# Patient Record
Sex: FEMALE | Race: WHITE | NOT HISPANIC OR LATINO | URBAN - METROPOLITAN AREA
[De-identification: names, ages, dates, MRNs, and addresses within clinical notes are randomized per-mention and may not be internally consistent; named-entity substitution may affect disease eponyms.]

---

## 2023-10-11 PROBLEM — F41.8 ANXIETY WITH DEPRESSION: Status: ACTIVE | Noted: 2023-10-11

## 2023-10-11 PROBLEM — E03.9 HYPOTHYROIDISM: Status: ACTIVE | Noted: 2023-10-11

## 2023-10-11 PROBLEM — E28.2 PCOS (POLYCYSTIC OVARIAN SYNDROME): Status: ACTIVE | Noted: 2023-10-11

## 2023-10-11 RX ORDER — ONDANSETRON HYDROCHLORIDE 8 MG/1
TABLET, FILM COATED ORAL
COMMUNITY
Start: 2016-06-07 | End: 2023-10-12

## 2023-10-11 RX ORDER — LEVOTHYROXINE SODIUM 100 UG/1
TABLET ORAL
COMMUNITY
End: 2023-10-16 | Stop reason: SDUPTHER

## 2023-10-11 RX ORDER — SERTRALINE HYDROCHLORIDE 50 MG/1
TABLET, FILM COATED ORAL
COMMUNITY
End: 2023-10-12 | Stop reason: ALTCHOICE

## 2023-10-11 RX ORDER — SPIRONOLACTONE 50 MG/1
TABLET, FILM COATED ORAL
COMMUNITY
Start: 2020-10-23 | End: 2023-10-12

## 2023-10-12 ENCOUNTER — OFFICE VISIT (OUTPATIENT)
Dept: PRIMARY CARE | Facility: CLINIC | Age: 43
End: 2023-10-12
Payer: COMMERCIAL

## 2023-10-12 VITALS
HEART RATE: 77 BPM | SYSTOLIC BLOOD PRESSURE: 124 MMHG | DIASTOLIC BLOOD PRESSURE: 72 MMHG | WEIGHT: 193 LBS | HEIGHT: 65 IN | OXYGEN SATURATION: 98 % | BODY MASS INDEX: 32.15 KG/M2

## 2023-10-12 DIAGNOSIS — L65.0 TELOGEN EFFLUVIUM: ICD-10-CM

## 2023-10-12 DIAGNOSIS — E03.8 HYPOTHYROIDISM DUE TO HASHIMOTO'S THYROIDITIS: Primary | ICD-10-CM

## 2023-10-12 DIAGNOSIS — E06.3 HYPOTHYROIDISM DUE TO HASHIMOTO'S THYROIDITIS: Primary | ICD-10-CM

## 2023-10-12 PROCEDURE — 1036F TOBACCO NON-USER: CPT

## 2023-10-12 PROCEDURE — 99213 OFFICE O/P EST LOW 20 MIN: CPT

## 2023-10-12 ASSESSMENT — ENCOUNTER SYMPTOMS
DIARRHEA: 0
CONSTIPATION: 0
PALPITATIONS: 0
LOSS OF SENSATION IN FEET: 0
FATIGUE: 1
UNEXPECTED WEIGHT CHANGE: 0
FEVER: 0
OCCASIONAL FEELINGS OF UNSTEADINESS: 0
DEPRESSION: 0

## 2023-10-12 ASSESSMENT — PAIN SCALES - GENERAL: PAINLEVEL: 5

## 2023-10-12 ASSESSMENT — PATIENT HEALTH QUESTIONNAIRE - PHQ9
SUM OF ALL RESPONSES TO PHQ9 QUESTIONS 1 AND 2: 0
2. FEELING DOWN, DEPRESSED OR HOPELESS: NOT AT ALL
1. LITTLE INTEREST OR PLEASURE IN DOING THINGS: NOT AT ALL

## 2023-10-12 NOTE — PROGRESS NOTES
"Subjective   Patient ID: Hannah BELLE is a 43 y.o. female who presents for Alopecia.    HPI   PMHx: Hypothyroidism (on 100 mcg, last TSH stable on 6/14/23)    All of sudden hair loss has increased for last 1-1.5 months. Patient states coming out by the handful. Patient increased life stress in April with divorce and 2 months ago youngest son has been having mental health problems. Associated dry skin, mild fatigue, cold intolerance. Denies constipation, diarrhea, heart palpitations, hand tremor, fever, no recent illnesses.     Review of Systems   Constitutional:  Positive for fatigue. Negative for fever and unexpected weight change.   Cardiovascular:  Negative for palpitations.   Gastrointestinal:  Negative for constipation and diarrhea.   Endocrine: Positive for cold intolerance. Negative for heat intolerance.       Objective   /72   Pulse 77   Ht 1.651 m (5' 5\")   Wt 87.5 kg (193 lb)   SpO2 98%   BMI 32.12 kg/m²     Physical Exam  Constitutional:       General: She is not in acute distress.     Appearance: Normal appearance.   HENT:      Head: Hair is abnormal.      Comments: Thinning of hair especially around crown of head with + pull test  Cardiovascular:      Rate and Rhythm: Normal rate and regular rhythm.      Heart sounds: No murmur heard.  Pulmonary:      Effort: Pulmonary effort is normal.      Breath sounds: Normal breath sounds. No wheezing, rhonchi or rales.   Skin:     General: Skin is warm and dry.      Findings: No rash.   Neurological:      Mental Status: She is alert.   Psychiatric:         Mood and Affect: Mood and affect normal.         Assessment/Plan   Diagnoses and all orders for this visit:  Hypothyroidism due to Hashimoto's thyroiditis  -     Tsh With Reflex To Free T4 If Abnormal; Future  Telogen effluvium  -Given patient printout information on Telogen effluvium, and recommended stress reduction, OTC Hair speed oil or rosemary oil, scalp massage and 5% Minoxidil.        "

## 2023-10-14 ENCOUNTER — LAB (OUTPATIENT)
Dept: LAB | Facility: LAB | Age: 43
End: 2023-10-14
Payer: COMMERCIAL

## 2023-10-14 DIAGNOSIS — E03.8 HYPOTHYROIDISM DUE TO HASHIMOTO'S THYROIDITIS: ICD-10-CM

## 2023-10-14 DIAGNOSIS — E06.3 HYPOTHYROIDISM DUE TO HASHIMOTO'S THYROIDITIS: ICD-10-CM

## 2023-10-14 PROCEDURE — 36415 COLL VENOUS BLD VENIPUNCTURE: CPT

## 2023-10-14 PROCEDURE — 84443 ASSAY THYROID STIM HORMONE: CPT

## 2023-10-14 PROCEDURE — 84439 ASSAY OF FREE THYROXINE: CPT

## 2023-10-15 LAB
T4 FREE SERPL-MCNC: 1.5 NG/DL (ref 0.9–1.7)
TSH SERPL DL<=0.05 MIU/L-ACNC: 4.45 MIU/L (ref 0.27–4.2)

## 2023-10-16 ENCOUNTER — TELEPHONE (OUTPATIENT)
Dept: PRIMARY CARE | Facility: CLINIC | Age: 43
End: 2023-10-16
Payer: COMMERCIAL

## 2023-10-16 DIAGNOSIS — E03.8 HYPOTHYROIDISM DUE TO HASHIMOTO'S THYROIDITIS: Primary | ICD-10-CM

## 2023-10-16 DIAGNOSIS — E06.3 HYPOTHYROIDISM DUE TO HASHIMOTO'S THYROIDITIS: Primary | ICD-10-CM

## 2023-10-16 RX ORDER — LEVOTHYROXINE SODIUM 112 UG/1
112 TABLET ORAL DAILY
Qty: 30 TABLET | Refills: 1 | Status: SHIPPED | OUTPATIENT
Start: 2023-10-16 | End: 2023-11-17 | Stop reason: SDUPTHER

## 2023-10-16 NOTE — TELEPHONE ENCOUNTER
Thyroid shows just slightly under-treated. Will increase Levothyroxine to 112 mcg, and re-check in 6 weeks. New med sent, labs ordered for 11/30/23.

## 2023-11-11 ENCOUNTER — LAB (OUTPATIENT)
Dept: LAB | Facility: LAB | Age: 43
End: 2023-11-11
Payer: COMMERCIAL

## 2023-11-11 DIAGNOSIS — E03.8 HYPOTHYROIDISM DUE TO HASHIMOTO'S THYROIDITIS: ICD-10-CM

## 2023-11-11 DIAGNOSIS — E06.3 HYPOTHYROIDISM DUE TO HASHIMOTO'S THYROIDITIS: ICD-10-CM

## 2023-11-11 LAB — TSH SERPL DL<=0.05 MIU/L-ACNC: 2.51 MIU/L (ref 0.27–4.2)

## 2023-11-11 PROCEDURE — 36415 COLL VENOUS BLD VENIPUNCTURE: CPT

## 2023-11-11 PROCEDURE — 84443 ASSAY THYROID STIM HORMONE: CPT

## 2023-11-13 DIAGNOSIS — E03.8 HYPOTHYROIDISM DUE TO HASHIMOTO'S THYROIDITIS: ICD-10-CM

## 2023-11-13 DIAGNOSIS — E06.3 HYPOTHYROIDISM DUE TO HASHIMOTO'S THYROIDITIS: ICD-10-CM

## 2023-11-13 NOTE — TELEPHONE ENCOUNTER
Thyroid is stable again. If levothyroxine medication refills needed let me know. (Make sure dose is correct in system).

## 2023-11-17 RX ORDER — LEVOTHYROXINE SODIUM 112 UG/1
112 TABLET ORAL DAILY
Qty: 90 TABLET | Refills: 3 | Status: SHIPPED | OUTPATIENT
Start: 2023-11-17 | End: 2024-01-08 | Stop reason: WASHOUT

## 2023-11-17 NOTE — TELEPHONE ENCOUNTER
Pt called and verbally understands. Pt state that she does need refills. Pt is taking Levothyroxine 112mcg.

## 2024-01-08 DIAGNOSIS — E03.8 HYPOTHYROIDISM DUE TO HASHIMOTO'S THYROIDITIS: Primary | ICD-10-CM

## 2024-01-08 DIAGNOSIS — E06.3 HYPOTHYROIDISM DUE TO HASHIMOTO'S THYROIDITIS: Primary | ICD-10-CM

## 2024-01-08 RX ORDER — LEVOTHYROXINE SODIUM 100 UG/1
100 TABLET ORAL
Qty: 90 TABLET | Refills: 2 | Status: SHIPPED | OUTPATIENT
Start: 2024-01-08 | End: 2024-02-02 | Stop reason: SDUPTHER

## 2024-02-02 ENCOUNTER — TELEPHONE (OUTPATIENT)
Dept: PRIMARY CARE | Facility: CLINIC | Age: 44
End: 2024-02-02
Payer: COMMERCIAL

## 2024-02-02 DIAGNOSIS — E06.3 HYPOTHYROIDISM DUE TO HASHIMOTO'S THYROIDITIS: ICD-10-CM

## 2024-02-02 DIAGNOSIS — E03.8 HYPOTHYROIDISM DUE TO HASHIMOTO'S THYROIDITIS: ICD-10-CM

## 2024-02-02 RX ORDER — LEVOTHYROXINE SODIUM 112 UG/1
112 TABLET ORAL
Qty: 90 TABLET | Refills: 0 | Status: SHIPPED | OUTPATIENT
Start: 2024-02-02 | End: 2024-04-24 | Stop reason: SDUPTHER

## 2024-02-02 RX ORDER — LEVOTHYROXINE SODIUM 112 UG/1
112 TABLET ORAL
Qty: 90 TABLET | Refills: 0 | Status: SHIPPED | OUTPATIENT
Start: 2024-02-02 | End: 2024-02-02 | Stop reason: WASHOUT

## 2024-02-02 NOTE — TELEPHONE ENCOUNTER
Last TSH on 112 mcg was normal, so I resent 90 refill for medication. At this time does not need to repeat labs but I will place order if patient wants them re-checked or was told by another provider needs re-checked.

## 2024-02-02 NOTE — TELEPHONE ENCOUNTER
Pt called requesting blood work orders be put in -- pt states she is required to get it done in order to get her Levothyroxine refills.      LOV:10/12/23

## 2024-04-24 ENCOUNTER — OFFICE VISIT (OUTPATIENT)
Dept: PRIMARY CARE | Facility: CLINIC | Age: 44
End: 2024-04-24
Payer: COMMERCIAL

## 2024-04-24 VITALS
BODY MASS INDEX: 31.49 KG/M2 | HEIGHT: 65 IN | HEART RATE: 80 BPM | SYSTOLIC BLOOD PRESSURE: 130 MMHG | DIASTOLIC BLOOD PRESSURE: 80 MMHG | OXYGEN SATURATION: 98 % | WEIGHT: 189 LBS

## 2024-04-24 DIAGNOSIS — E06.3 HYPOTHYROIDISM DUE TO HASHIMOTO'S THYROIDITIS: ICD-10-CM

## 2024-04-24 DIAGNOSIS — H00.011 HORDEOLUM EXTERNUM OF RIGHT UPPER EYELID: Primary | ICD-10-CM

## 2024-04-24 DIAGNOSIS — E03.8 HYPOTHYROIDISM DUE TO HASHIMOTO'S THYROIDITIS: ICD-10-CM

## 2024-04-24 PROCEDURE — 99213 OFFICE O/P EST LOW 20 MIN: CPT

## 2024-04-24 PROCEDURE — 1036F TOBACCO NON-USER: CPT

## 2024-04-24 RX ORDER — ERYTHROMYCIN 5 MG/G
OINTMENT OPHTHALMIC 4 TIMES DAILY
Qty: 3.5 G | Refills: 0 | Status: SHIPPED | OUTPATIENT
Start: 2024-04-24 | End: 2024-05-01

## 2024-04-24 RX ORDER — LEVOTHYROXINE SODIUM 112 UG/1
112 TABLET ORAL
Qty: 90 TABLET | Refills: 1 | Status: SHIPPED | OUTPATIENT
Start: 2024-04-24

## 2024-04-24 ASSESSMENT — ENCOUNTER SYMPTOMS
FEVER: 0
EYE ITCHING: 1
EYE REDNESS: 1
EYE DISCHARGE: 0
FATIGUE: 0
EYE PAIN: 1
CHILLS: 0

## 2024-04-24 ASSESSMENT — PAIN SCALES - GENERAL: PAINLEVEL: 0-NO PAIN

## 2024-04-24 ASSESSMENT — PATIENT HEALTH QUESTIONNAIRE - PHQ9
SUM OF ALL RESPONSES TO PHQ9 QUESTIONS 1 AND 2: 0
1. LITTLE INTEREST OR PLEASURE IN DOING THINGS: NOT AT ALL
2. FEELING DOWN, DEPRESSED OR HOPELESS: NOT AT ALL

## 2024-04-24 NOTE — PROGRESS NOTES
"Subjective   Patient ID: Hannah BELLE is a 43 y.o. female who presents for Med Refill (Levothyroxine ) and Eye Problem (Pt rt eye red and swollen, pain since Sunday /la).    Hx of hashimoto's hypothyroidism    Hypothyroidism: currently controlled on 112 mcg, adjustments made last fall. Last TSH stable 11/2023. Feeling euthyroid. Patient feels much better on higher dose, hair loss and fatigue improved.     Upper eyelid swelling x 3 days. Patient states had stye on left eye as well, but currently worsening on right side. She is doing warm compresses.          Review of Systems   Constitutional:  Negative for chills, fatigue and fever.   Eyes:  Positive for pain, redness and itching. Negative for discharge and visual disturbance.   Endocrine: Negative for cold intolerance and heat intolerance.       Objective   /80   Pulse 80   Ht 1.651 m (5' 5\")   Wt 85.7 kg (189 lb)   SpO2 98%   BMI 31.45 kg/m²     Physical Exam  Constitutional:       General: She is not in acute distress.     Appearance: Normal appearance.   Eyes:      General:         Right eye: Hordeolum present.      Comments: Upper eyelid red swollen with stye in the middle of lash line   Neck:      Thyroid: No thyroid mass or thyromegaly.   Cardiovascular:      Rate and Rhythm: Normal rate and regular rhythm.      Heart sounds: No murmur heard.  Pulmonary:      Effort: Pulmonary effort is normal.      Breath sounds: Normal breath sounds. No wheezing, rhonchi or rales.   Skin:     General: Skin is warm and dry.      Findings: No rash.   Neurological:      Mental Status: She is alert.   Psychiatric:         Mood and Affect: Mood and affect normal.         Assessment/Plan   Diagnoses and all orders for this visit:  Hordeolum externum of right upper eyelid  -     erythromycin (Romycin) 5 mg/gram (0.5 %) ophthalmic ointment; Apply to affected eye(s) 4 times a day for 7 days. Apply Amount per Dose: 0.25 inch (~0.5 cm) per dose.  Hypothyroidism due to " Hashimoto's thyroiditis  -     levothyroxine (Synthroid, Levoxyl) 112 mcg tablet; Take 1 tablet (112 mcg) by mouth once daily in the morning. Take before meals. on an empty stomach  -     Tsh With Reflex To Free T4 If Abnormal; Future  Continue warm compresses. Follow-up in 6 months for CPE.

## 2024-04-27 ENCOUNTER — LAB (OUTPATIENT)
Dept: LAB | Facility: LAB | Age: 44
End: 2024-04-27
Payer: COMMERCIAL

## 2024-04-27 DIAGNOSIS — E03.8 HYPOTHYROIDISM DUE TO HASHIMOTO'S THYROIDITIS: ICD-10-CM

## 2024-04-27 DIAGNOSIS — E06.3 HYPOTHYROIDISM DUE TO HASHIMOTO'S THYROIDITIS: ICD-10-CM

## 2024-04-27 LAB — TSH SERPL DL<=0.05 MIU/L-ACNC: 2.69 MIU/L (ref 0.27–4.2)

## 2024-04-27 PROCEDURE — 36415 COLL VENOUS BLD VENIPUNCTURE: CPT

## 2024-04-27 PROCEDURE — 84443 ASSAY THYROID STIM HORMONE: CPT

## 2024-04-29 ENCOUNTER — TELEPHONE (OUTPATIENT)
Dept: PRIMARY CARE | Facility: CLINIC | Age: 44
End: 2024-04-29
Payer: COMMERCIAL

## 2024-10-24 ENCOUNTER — OFFICE VISIT (OUTPATIENT)
Dept: PRIMARY CARE | Facility: CLINIC | Age: 44
End: 2024-10-24
Payer: COMMERCIAL

## 2024-10-24 VITALS
OXYGEN SATURATION: 98 % | HEIGHT: 65 IN | DIASTOLIC BLOOD PRESSURE: 72 MMHG | SYSTOLIC BLOOD PRESSURE: 116 MMHG | WEIGHT: 196 LBS | HEART RATE: 79 BPM | BODY MASS INDEX: 32.65 KG/M2

## 2024-10-24 DIAGNOSIS — E06.3 HYPOTHYROIDISM DUE TO HASHIMOTO THYROIDITIS: ICD-10-CM

## 2024-10-24 DIAGNOSIS — Z90.710 S/P HYSTERECTOMY: ICD-10-CM

## 2024-10-24 DIAGNOSIS — Z12.31 SCREENING MAMMOGRAM FOR BREAST CANCER: ICD-10-CM

## 2024-10-24 DIAGNOSIS — Z00.00 ANNUAL PHYSICAL EXAM: Primary | ICD-10-CM

## 2024-10-24 DIAGNOSIS — Z13.1 SCREENING FOR DIABETES MELLITUS: ICD-10-CM

## 2024-10-24 DIAGNOSIS — Z11.59 ENCOUNTER FOR HEPATITIS C SCREENING TEST FOR LOW RISK PATIENT: ICD-10-CM

## 2024-10-24 DIAGNOSIS — Z13.220 SCREENING FOR LIPID DISORDERS: ICD-10-CM

## 2024-10-24 DIAGNOSIS — Z23 ENCOUNTER FOR IMMUNIZATION: ICD-10-CM

## 2024-10-24 DIAGNOSIS — E55.9 VITAMIN D DEFICIENCY: ICD-10-CM

## 2024-10-24 DIAGNOSIS — R60.0 PERIPHERAL EDEMA: ICD-10-CM

## 2024-10-24 PROCEDURE — 90471 IMMUNIZATION ADMIN: CPT

## 2024-10-24 PROCEDURE — 3008F BODY MASS INDEX DOCD: CPT

## 2024-10-24 PROCEDURE — 90656 IIV3 VACC NO PRSV 0.5 ML IM: CPT

## 2024-10-24 PROCEDURE — 99396 PREV VISIT EST AGE 40-64: CPT

## 2024-10-24 PROCEDURE — 90472 IMMUNIZATION ADMIN EACH ADD: CPT

## 2024-10-24 PROCEDURE — 1036F TOBACCO NON-USER: CPT

## 2024-10-24 PROCEDURE — 90715 TDAP VACCINE 7 YRS/> IM: CPT

## 2024-10-24 ASSESSMENT — ENCOUNTER SYMPTOMS
ADENOPATHY: 0
HEMATURIA: 0
ARTHRALGIAS: 0
FATIGUE: 0
DIFFICULTY URINATING: 0
SHORTNESS OF BREATH: 0
FEVER: 0
BLOOD IN STOOL: 0
SORE THROAT: 0
LIGHT-HEADEDNESS: 0
COUGH: 0
NAUSEA: 0
DIARRHEA: 0
JOINT SWELLING: 1
DIAPHORESIS: 0
PALPITATIONS: 0
MYALGIAS: 0
WHEEZING: 0
DYSURIA: 0
VOMITING: 0

## 2024-10-24 ASSESSMENT — PATIENT HEALTH QUESTIONNAIRE - PHQ9
1. LITTLE INTEREST OR PLEASURE IN DOING THINGS: NOT AT ALL
2. FEELING DOWN, DEPRESSED OR HOPELESS: NOT AT ALL
SUM OF ALL RESPONSES TO PHQ9 QUESTIONS 1 AND 2: 0

## 2024-10-24 ASSESSMENT — PAIN SCALES - GENERAL: PAINLEVEL_OUTOF10: 0-NO PAIN

## 2024-10-24 NOTE — PROGRESS NOTES
"Subjective   Patient ID: Hannah BELLE is a 44 y.o. female who presents for Annual Exam (Concerned for swelling on hands and feet /la).    Hx of hypothyroidism, s/p hysterectomy 2015 due to menorrhagia    Other providers: none    Hashimoto's Hypothyroidism: on 112 mcg of levothyroxine. Due for TSH. Hair loss has been improving, has been taking collagen peptides as well.     Acute concerns: Swelling in hands and feet x 1 week. Worse in the morning. Patient drinks plenty of water throughout the day. Stomach also has felt bloated. Occurred prior when thyroid first diagnosed. Denies pain, redness.     HM: PAP N/A due to hysterectomy. Mammogram, never done, order placed. Colon screening, start 45. Lung screening, quit 4 years ago. DEXA start 65. Vaccinations flu and Tdap.           Review of Systems   Constitutional:  Negative for diaphoresis, fatigue and fever.   HENT:  Negative for congestion, hearing loss and sore throat.    Eyes:  Negative for visual disturbance.   Respiratory:  Negative for cough, shortness of breath and wheezing.    Cardiovascular:  Positive for leg swelling. Negative for chest pain and palpitations.   Gastrointestinal:  Negative for blood in stool, diarrhea, nausea and vomiting.   Genitourinary:  Negative for difficulty urinating, dysuria and hematuria.   Musculoskeletal:  Positive for joint swelling. Negative for arthralgias and myalgias.   Skin:  Negative for rash.   Allergic/Immunologic: Negative for immunocompromised state.   Neurological:  Negative for syncope and light-headedness.   Hematological:  Negative for adenopathy.   Psychiatric/Behavioral:  Negative for suicidal ideas.        Objective   /72   Pulse 79   Ht 1.651 m (5' 5\")   Wt 88.9 kg (196 lb)   SpO2 98%   BMI 32.62 kg/m²     Physical Exam  Constitutional:       General: She is not in acute distress.     Appearance: Normal appearance.   HENT:      Right Ear: Tympanic membrane and ear canal normal.      Left Ear: " Tympanic membrane and ear canal normal.      Nose: Nose normal.      Mouth/Throat:      Mouth: Mucous membranes are moist.      Pharynx: Oropharynx is clear. No oropharyngeal exudate or posterior oropharyngeal erythema.   Eyes:      Extraocular Movements: Extraocular movements intact.      Conjunctiva/sclera: Conjunctivae normal.      Pupils: Pupils are equal, round, and reactive to light.   Neck:      Thyroid: No thyroid mass or thyromegaly.   Cardiovascular:      Rate and Rhythm: Normal rate and regular rhythm.      Pulses: Normal pulses.      Heart sounds: No murmur heard.     No gallop.   Pulmonary:      Effort: Pulmonary effort is normal.      Breath sounds: No wheezing, rhonchi or rales.   Abdominal:      General: Bowel sounds are normal.      Palpations: Abdomen is soft. There is no hepatomegaly, splenomegaly or mass.      Tenderness: There is no abdominal tenderness. There is no guarding.   Musculoskeletal:         General: Normal range of motion.      Right lower leg: No edema.      Left lower leg: No edema.   Lymphadenopathy:      Cervical: No cervical adenopathy.   Skin:     General: Skin is warm.      Findings: No rash.   Neurological:      General: No focal deficit present.      Mental Status: She is alert.      Motor: Motor function is intact. No weakness.   Psychiatric:         Mood and Affect: Mood normal.         Thought Content: Thought content normal.         Assessment/Plan   Diagnoses and all orders for this visit:  Annual physical exam  Hypothyroidism due to Hashimoto thyroiditis  -     Tsh With Reflex To Free T4 If Abnormal; Future  S/P hysterectomy  Screening mammogram for breast cancer  -     BI mammo bilateral screening tomosynthesis; Future  Encounter for immunization  -     Flu vaccine, trivalent, preservative free, age 6 months and greater (Fluraix/Fluzone/Flulaval)  Vitamin D deficiency  -     Vitamin D 25-Hydroxy,Total (for eval of Vitamin D levels); Future  Encounter for hepatitis C  screening test for low risk patient  -     Hepatitis C Antibody; Future  Screening for lipid disorders  -     Lipid panel; Future  Screening for diabetes mellitus  -     Comprehensive metabolic panel; Future  Peripheral edema  Other orders  -     Tdap vaccine, age 7 years and older  (BOOSTRIX)  Will check labs for peripheral swelling, could be due to TSH out of range, if labs normal and still persisting another 2 weeks, will work up further/consider diuretic. F/U 6 months for thyroid or prn sooner.

## 2024-11-08 ENCOUNTER — LAB (OUTPATIENT)
Dept: LAB | Facility: LAB | Age: 44
End: 2024-11-08
Payer: COMMERCIAL

## 2024-11-08 ENCOUNTER — HOSPITAL ENCOUNTER (OUTPATIENT)
Dept: RADIOLOGY | Facility: CLINIC | Age: 44
Discharge: HOME | End: 2024-11-08
Payer: COMMERCIAL

## 2024-11-08 VITALS — WEIGHT: 196 LBS | BODY MASS INDEX: 32.62 KG/M2

## 2024-11-08 DIAGNOSIS — Z13.220 SCREENING FOR LIPID DISORDERS: ICD-10-CM

## 2024-11-08 DIAGNOSIS — E55.9 VITAMIN D DEFICIENCY: ICD-10-CM

## 2024-11-08 DIAGNOSIS — E06.3 HYPOTHYROIDISM DUE TO HASHIMOTO'S THYROIDITIS: ICD-10-CM

## 2024-11-08 DIAGNOSIS — Z12.31 SCREENING MAMMOGRAM FOR BREAST CANCER: ICD-10-CM

## 2024-11-08 DIAGNOSIS — E06.3 HYPOTHYROIDISM DUE TO HASHIMOTO THYROIDITIS: ICD-10-CM

## 2024-11-08 DIAGNOSIS — Z11.59 ENCOUNTER FOR HEPATITIS C SCREENING TEST FOR LOW RISK PATIENT: ICD-10-CM

## 2024-11-08 DIAGNOSIS — Z13.1 SCREENING FOR DIABETES MELLITUS: ICD-10-CM

## 2024-11-08 LAB
25(OH)D3 SERPL-MCNC: 30 NG/ML (ref 30–100)
ALBUMIN SERPL BCP-MCNC: 3.5 G/DL (ref 3.4–5)
ALP SERPL-CCNC: 30 U/L (ref 33–110)
ALT SERPL W P-5'-P-CCNC: 11 U/L (ref 7–45)
ANION GAP SERPL CALCULATED.3IONS-SCNC: 10 MMOL/L (ref 10–20)
AST SERPL W P-5'-P-CCNC: 15 U/L (ref 9–39)
BILIRUB SERPL-MCNC: 0.6 MG/DL (ref 0–1.2)
BUN SERPL-MCNC: 12 MG/DL (ref 6–23)
CALCIUM SERPL-MCNC: 8.2 MG/DL (ref 8.6–10.3)
CHLORIDE SERPL-SCNC: 113 MMOL/L (ref 98–107)
CHOLEST SERPL-MCNC: 185 MG/DL (ref 0–199)
CHOLEST/HDLC SERPL: 3.1 {RATIO}
CO2 SERPL-SCNC: 23 MMOL/L (ref 21–32)
CREAT SERPL-MCNC: 0.82 MG/DL (ref 0.5–1.05)
EGFRCR SERPLBLD CKD-EPI 2021: >90 ML/MIN/1.73M*2
GLUCOSE SERPL-MCNC: 80 MG/DL (ref 74–99)
HCV AB SER QL: NONREACTIVE
HDLC SERPL-MCNC: 59.3 MG/DL
LDLC SERPL CALC-MCNC: 116 MG/DL
NON HDL CHOLESTEROL: 126 MG/DL (ref 0–149)
POTASSIUM SERPL-SCNC: 4.2 MMOL/L (ref 3.5–5.3)
PROT SERPL-MCNC: 5.4 G/DL (ref 6.4–8.2)
SODIUM SERPL-SCNC: 142 MMOL/L (ref 136–145)
T4 FREE SERPL-MCNC: 1.11 NG/DL (ref 0.61–1.12)
TRIGL SERPL-MCNC: 47 MG/DL (ref 0–149)
TSH SERPL-ACNC: 7.75 MIU/L (ref 0.44–3.98)
VLDL: 9 MG/DL (ref 0–40)

## 2024-11-08 PROCEDURE — 84443 ASSAY THYROID STIM HORMONE: CPT

## 2024-11-08 PROCEDURE — 36415 COLL VENOUS BLD VENIPUNCTURE: CPT

## 2024-11-08 PROCEDURE — 82306 VITAMIN D 25 HYDROXY: CPT

## 2024-11-08 PROCEDURE — 86803 HEPATITIS C AB TEST: CPT

## 2024-11-08 PROCEDURE — 77067 SCR MAMMO BI INCL CAD: CPT

## 2024-11-08 PROCEDURE — 80061 LIPID PANEL: CPT

## 2024-11-08 PROCEDURE — 80053 COMPREHEN METABOLIC PANEL: CPT

## 2024-11-08 PROCEDURE — 84439 ASSAY OF FREE THYROXINE: CPT

## 2024-11-08 RX ORDER — LEVOTHYROXINE SODIUM 112 UG/1
TABLET ORAL
Qty: 90 TABLET | Refills: 1 | Status: SHIPPED | OUTPATIENT
Start: 2024-11-08

## 2024-11-11 ENCOUNTER — TELEPHONE (OUTPATIENT)
Dept: PRIMARY CARE | Facility: CLINIC | Age: 44
End: 2024-11-11
Payer: COMMERCIAL

## 2024-11-11 DIAGNOSIS — R79.89 LOW SERUM TOTAL PROTEIN LEVEL: Primary | ICD-10-CM

## 2024-11-11 DIAGNOSIS — E06.3 HYPOTHYROIDISM DUE TO HASHIMOTO'S THYROIDITIS: ICD-10-CM

## 2024-11-11 RX ORDER — LEVOTHYROXINE SODIUM 125 UG/1
125 TABLET ORAL DAILY
Qty: 30 TABLET | Refills: 1 | Status: SHIPPED | OUTPATIENT
Start: 2024-11-11

## 2024-11-11 NOTE — TELEPHONE ENCOUNTER
Thyroid levels are out of range, which most likely causing swelling and potentially reason for low protein level in lab. Sent in higher dose of thyroid medication and want patient to-check blood work in 4 weeks. Order placed.

## 2024-11-12 ENCOUNTER — TELEPHONE (OUTPATIENT)
Dept: PRIMARY CARE | Facility: CLINIC | Age: 44
End: 2024-11-12
Payer: COMMERCIAL

## 2024-11-12 DIAGNOSIS — R92.8 ABNORMAL SCREENING MAMMOGRAM: Primary | ICD-10-CM

## 2024-11-18 ENCOUNTER — HOSPITAL ENCOUNTER (OUTPATIENT)
Dept: RADIOLOGY | Facility: CLINIC | Age: 44
Discharge: HOME | End: 2024-11-18
Payer: COMMERCIAL

## 2024-11-18 DIAGNOSIS — R92.8 ABNORMAL SCREENING MAMMOGRAM: ICD-10-CM

## 2024-11-18 PROCEDURE — 77061 BREAST TOMOSYNTHESIS UNI: CPT | Mod: LT

## 2024-11-18 PROCEDURE — 77061 BREAST TOMOSYNTHESIS UNI: CPT | Mod: LEFT SIDE | Performed by: RADIOLOGY

## 2024-11-18 PROCEDURE — 76981 USE PARENCHYMA: CPT | Mod: LT

## 2024-11-18 PROCEDURE — 76642 ULTRASOUND BREAST LIMITED: CPT | Mod: LT

## 2024-11-18 PROCEDURE — 77065 DX MAMMO INCL CAD UNI: CPT | Mod: LEFT SIDE | Performed by: RADIOLOGY

## 2024-11-18 PROCEDURE — 76642 ULTRASOUND BREAST LIMITED: CPT | Mod: LEFT SIDE | Performed by: RADIOLOGY

## 2024-11-19 ENCOUNTER — TELEPHONE (OUTPATIENT)
Dept: PRIMARY CARE | Facility: CLINIC | Age: 44
End: 2024-11-19
Payer: COMMERCIAL

## 2024-12-09 ENCOUNTER — LAB (OUTPATIENT)
Dept: LAB | Facility: LAB | Age: 44
End: 2024-12-09
Payer: COMMERCIAL

## 2024-12-09 DIAGNOSIS — E06.3 HYPOTHYROIDISM DUE TO HASHIMOTO'S THYROIDITIS: ICD-10-CM

## 2024-12-09 DIAGNOSIS — R79.89 LOW SERUM TOTAL PROTEIN LEVEL: ICD-10-CM

## 2024-12-09 LAB
ANION GAP SERPL CALCULATED.3IONS-SCNC: 12 MMOL/L (ref 10–20)
BUN SERPL-MCNC: 13 MG/DL (ref 6–23)
CALCIUM SERPL-MCNC: 8.3 MG/DL (ref 8.6–10.3)
CHLORIDE SERPL-SCNC: 110 MMOL/L (ref 98–107)
CO2 SERPL-SCNC: 21 MMOL/L (ref 21–32)
CREAT SERPL-MCNC: 0.82 MG/DL (ref 0.5–1.05)
EGFRCR SERPLBLD CKD-EPI 2021: >90 ML/MIN/1.73M*2
GLUCOSE SERPL-MCNC: 80 MG/DL (ref 74–99)
POTASSIUM SERPL-SCNC: 3.6 MMOL/L (ref 3.5–5.3)
SODIUM SERPL-SCNC: 139 MMOL/L (ref 136–145)
TSH SERPL-ACNC: 0.83 MIU/L (ref 0.44–3.98)

## 2024-12-09 PROCEDURE — 80048 BASIC METABOLIC PNL TOTAL CA: CPT

## 2024-12-09 PROCEDURE — 36415 COLL VENOUS BLD VENIPUNCTURE: CPT

## 2024-12-09 PROCEDURE — 84443 ASSAY THYROID STIM HORMONE: CPT

## 2024-12-10 ENCOUNTER — TELEPHONE (OUTPATIENT)
Dept: PRIMARY CARE | Facility: CLINIC | Age: 44
End: 2024-12-10
Payer: COMMERCIAL

## 2024-12-10 DIAGNOSIS — E06.3 HYPOTHYROIDISM DUE TO HASHIMOTO'S THYROIDITIS: ICD-10-CM

## 2024-12-10 RX ORDER — LEVOTHYROXINE SODIUM 125 UG/1
125 TABLET ORAL DAILY
Qty: 90 TABLET | Refills: 1 | Status: SHIPPED | OUTPATIENT
Start: 2024-12-10

## 2024-12-10 NOTE — TELEPHONE ENCOUNTER
Calcium levels are still low, I want patient to start taking a daily vitamin D of 1000 units daily which can in turn help with calcium. Then will re-check in the spring.

## 2024-12-10 NOTE — TELEPHONE ENCOUNTER
Thyroid levels back to normal, does patient need refill?    Also is patient taking vitamin D supplement right now?    No

## 2024-12-10 NOTE — TELEPHONE ENCOUNTER
Spoke with patient who verbally understands. Patient is not currently taking a vitamin D supplement.

## 2024-12-11 NOTE — TELEPHONE ENCOUNTER
Left detailed message explaining this to patient and told her to callback if she has any further questions

## 2025-04-24 ENCOUNTER — OFFICE VISIT (OUTPATIENT)
Dept: PRIMARY CARE | Facility: CLINIC | Age: 45
End: 2025-04-24
Payer: COMMERCIAL

## 2025-04-24 VITALS
SYSTOLIC BLOOD PRESSURE: 118 MMHG | BODY MASS INDEX: 34.66 KG/M2 | DIASTOLIC BLOOD PRESSURE: 82 MMHG | WEIGHT: 208 LBS | OXYGEN SATURATION: 98 % | HEART RATE: 78 BPM | HEIGHT: 65 IN

## 2025-04-24 DIAGNOSIS — E06.3 HYPOTHYROIDISM DUE TO HASHIMOTO THYROIDITIS: Primary | ICD-10-CM

## 2025-04-24 DIAGNOSIS — R06.00 DYSPNEA, UNSPECIFIED TYPE: ICD-10-CM

## 2025-04-24 DIAGNOSIS — R63.5 WEIGHT GAIN: ICD-10-CM

## 2025-04-24 PROCEDURE — 3008F BODY MASS INDEX DOCD: CPT

## 2025-04-24 PROCEDURE — 1036F TOBACCO NON-USER: CPT

## 2025-04-24 PROCEDURE — 99214 OFFICE O/P EST MOD 30 MIN: CPT

## 2025-04-24 ASSESSMENT — PAIN SCALES - GENERAL: PAINLEVEL_OUTOF10: 7

## 2025-04-24 ASSESSMENT — ENCOUNTER SYMPTOMS
FEVER: 0
CHILLS: 0
SHORTNESS OF BREATH: 1
SLEEP DISTURBANCE: 1
UNEXPECTED WEIGHT CHANGE: 1
FATIGUE: 0
PALPITATIONS: 0

## 2025-04-24 NOTE — PROGRESS NOTES
"Subjective   Patient ID: Hannah Woods is a 44 y.o. female who presents for Thyroid Problem.    Hx of hypothyroidism, s/p hysterectomy 2015 due to menorrhagia    Other providers: none    Hashimoto's Hypothyroidism: on 112 mcg of levothyroxine. Patient has been gaining weight uncontrollable last few months, and feels thyroid may be of. +cold tolerance, swelling in hands. About 12 lbs from 6 month ago based on scale in office. +SOB on exertion. No change in diet. Not currently exercising. Denies constipation, dysphagia, hair loss.             Review of Systems   Constitutional:  Positive for unexpected weight change. Negative for chills, fatigue and fever.   Respiratory:  Positive for shortness of breath.    Cardiovascular:  Negative for chest pain and palpitations.   Endocrine: Positive for cold intolerance.   Psychiatric/Behavioral:  Positive for sleep disturbance (people waking her up at night).        Objective   /82   Pulse 78   Ht 1.651 m (5' 5\")   Wt 94.3 kg (208 lb)   SpO2 98%   BMI 34.61 kg/m²     Physical Exam  Constitutional:       General: She is not in acute distress.     Appearance: Normal appearance.   Neck:      Thyroid: No thyroid mass, thyromegaly or thyroid tenderness.   Cardiovascular:      Rate and Rhythm: Normal rate and regular rhythm.      Heart sounds: No murmur heard.  Pulmonary:      Effort: Pulmonary effort is normal.      Breath sounds: Normal breath sounds. No wheezing, rhonchi or rales.   Skin:     General: Skin is warm and dry.      Findings: No rash.   Neurological:      Mental Status: She is alert.   Psychiatric:         Mood and Affect: Mood and affect normal.         Assessment/Plan   Diagnoses and all orders for this visit:  Hypothyroidism due to Hashimoto thyroiditis  -     Tsh With Reflex To Free T4 If Abnormal; Future  -     T4, free; Future  Dyspnea, unspecified type  -     CBC and Auto Differential; Future  -     Basic metabolic panel; Future  Weight gain  -     " FSH & LH; Future  -     Estradiol; Future  -     Testosterone; Future  Obtain labs, then will fill thyroid medication and give other suggestions for weight base on labs. Otherwise follow-up 6 months for CPE.

## 2025-04-26 LAB
ANION GAP SERPL CALCULATED.4IONS-SCNC: 9 MMOL/L (CALC) (ref 7–17)
BASOPHILS # BLD AUTO: 42 CELLS/UL (ref 0–200)
BASOPHILS NFR BLD AUTO: 0.8 %
BUN SERPL-MCNC: 14 MG/DL (ref 7–25)
BUN/CREAT SERPL: NORMAL (CALC) (ref 6–22)
CALCIUM SERPL-MCNC: 8.7 MG/DL (ref 8.6–10.2)
CHLORIDE SERPL-SCNC: 107 MMOL/L (ref 98–110)
CO2 SERPL-SCNC: 23 MMOL/L (ref 20–32)
CREAT SERPL-MCNC: 0.73 MG/DL (ref 0.5–0.99)
EGFRCR SERPLBLD CKD-EPI 2021: 104 ML/MIN/1.73M2
EOSINOPHIL # BLD AUTO: 239 CELLS/UL (ref 15–500)
EOSINOPHIL NFR BLD AUTO: 4.6 %
ERYTHROCYTE [DISTWIDTH] IN BLOOD BY AUTOMATED COUNT: 12.2 % (ref 11–15)
ESTRADIOL SERPL-MCNC: 178 PG/ML
FSH SERPL-ACNC: 4.3 MIU/ML
GLUCOSE SERPL-MCNC: 80 MG/DL (ref 65–99)
HCT VFR BLD AUTO: 43.4 % (ref 35–45)
HGB BLD-MCNC: 14.6 G/DL (ref 11.7–15.5)
LH SERPL-ACNC: 5.1 MIU/ML
LYMPHOCYTES # BLD AUTO: 1789 CELLS/UL (ref 850–3900)
LYMPHOCYTES NFR BLD AUTO: 34.4 %
MCH RBC QN AUTO: 30.9 PG (ref 27–33)
MCHC RBC AUTO-ENTMCNC: 33.6 G/DL (ref 32–36)
MCV RBC AUTO: 91.9 FL (ref 80–100)
MONOCYTES # BLD AUTO: 250 CELLS/UL (ref 200–950)
MONOCYTES NFR BLD AUTO: 4.8 %
NEUTROPHILS # BLD AUTO: 2881 CELLS/UL (ref 1500–7800)
NEUTROPHILS NFR BLD AUTO: 55.4 %
PLATELET # BLD AUTO: 222 THOUSAND/UL (ref 140–400)
PMV BLD REES-ECKER: 10.1 FL (ref 7.5–12.5)
POTASSIUM SERPL-SCNC: 3.8 MMOL/L (ref 3.5–5.3)
RBC # BLD AUTO: 4.72 MILLION/UL (ref 3.8–5.1)
SODIUM SERPL-SCNC: 139 MMOL/L (ref 135–146)
T4 FREE SERPL-MCNC: 1.4 NG/DL (ref 0.8–1.8)
TESTOST SERPL-MCNC: NORMAL NG/DL
TSH SERPL-ACNC: 2.82 MIU/L
WBC # BLD AUTO: 5.2 THOUSAND/UL (ref 3.8–10.8)

## 2025-04-28 LAB
ANION GAP SERPL CALCULATED.4IONS-SCNC: 9 MMOL/L (CALC) (ref 7–17)
BASOPHILS # BLD AUTO: 42 CELLS/UL (ref 0–200)
BASOPHILS NFR BLD AUTO: 0.8 %
BUN SERPL-MCNC: 14 MG/DL (ref 7–25)
BUN/CREAT SERPL: NORMAL (CALC) (ref 6–22)
CALCIUM SERPL-MCNC: 8.7 MG/DL (ref 8.6–10.2)
CHLORIDE SERPL-SCNC: 107 MMOL/L (ref 98–110)
CO2 SERPL-SCNC: 23 MMOL/L (ref 20–32)
CREAT SERPL-MCNC: 0.73 MG/DL (ref 0.5–0.99)
EGFRCR SERPLBLD CKD-EPI 2021: 104 ML/MIN/1.73M2
EOSINOPHIL # BLD AUTO: 239 CELLS/UL (ref 15–500)
EOSINOPHIL NFR BLD AUTO: 4.6 %
ERYTHROCYTE [DISTWIDTH] IN BLOOD BY AUTOMATED COUNT: 12.2 % (ref 11–15)
ESTRADIOL SERPL-MCNC: 178 PG/ML
FSH SERPL-ACNC: 4.3 MIU/ML
GLUCOSE SERPL-MCNC: 80 MG/DL (ref 65–99)
HCT VFR BLD AUTO: 43.4 % (ref 35–45)
HGB BLD-MCNC: 14.6 G/DL (ref 11.7–15.5)
LH SERPL-ACNC: 5.1 MIU/ML
LYMPHOCYTES # BLD AUTO: 1789 CELLS/UL (ref 850–3900)
LYMPHOCYTES NFR BLD AUTO: 34.4 %
MCH RBC QN AUTO: 30.9 PG (ref 27–33)
MCHC RBC AUTO-ENTMCNC: 33.6 G/DL (ref 32–36)
MCV RBC AUTO: 91.9 FL (ref 80–100)
MONOCYTES # BLD AUTO: 250 CELLS/UL (ref 200–950)
MONOCYTES NFR BLD AUTO: 4.8 %
NEUTROPHILS # BLD AUTO: 2881 CELLS/UL (ref 1500–7800)
NEUTROPHILS NFR BLD AUTO: 55.4 %
PLATELET # BLD AUTO: 222 THOUSAND/UL (ref 140–400)
PMV BLD REES-ECKER: 10.1 FL (ref 7.5–12.5)
POTASSIUM SERPL-SCNC: 3.8 MMOL/L (ref 3.5–5.3)
RBC # BLD AUTO: 4.72 MILLION/UL (ref 3.8–5.1)
SODIUM SERPL-SCNC: 139 MMOL/L (ref 135–146)
T4 FREE SERPL-MCNC: 1.4 NG/DL (ref 0.8–1.8)
TESTOST SERPL-MCNC: 22 NG/DL (ref 2–45)
TSH SERPL-ACNC: 2.82 MIU/L
WBC # BLD AUTO: 5.2 THOUSAND/UL (ref 3.8–10.8)

## 2025-04-29 ENCOUNTER — TELEPHONE (OUTPATIENT)
Dept: PRIMARY CARE | Facility: CLINIC | Age: 45
End: 2025-04-29
Payer: COMMERCIAL

## 2025-04-29 DIAGNOSIS — E06.3 HYPOTHYROIDISM DUE TO HASHIMOTO'S THYROIDITIS: ICD-10-CM

## 2025-04-29 RX ORDER — LEVOTHYROXINE SODIUM 125 UG/1
125 TABLET ORAL DAILY
Qty: 90 TABLET | Refills: 1 | Status: SHIPPED | OUTPATIENT
Start: 2025-04-29

## 2025-04-29 NOTE — TELEPHONE ENCOUNTER
Spoke with patient, she understood results. She said she has seen a dietician before and she was not happy with the service.    She said she would call her insurance and see what medications are covered if any and let us know.    See attached rx for synthroid medication for refill.

## 2025-04-29 NOTE — TELEPHONE ENCOUNTER
Patient testosterone level still pending but so far all other labs normal including thyroid. Also hormone levels show she is not in menopause right now, ovaries still producing estrogen.    For her weight gain would she like to manage her self with diet, exercise, and improve sleep. Would she like to see dietician. If she is interested in any weight loss medications would first have her call insurance and see if any medication for weight loss covered.     Let me know what next steps she would liek to take.

## 2025-06-14 ENCOUNTER — HOSPITAL ENCOUNTER (EMERGENCY)
Facility: HOSPITAL | Age: 45
Discharge: HOME | End: 2025-06-14
Attending: EMERGENCY MEDICINE
Payer: COMMERCIAL

## 2025-06-14 ENCOUNTER — APPOINTMENT (OUTPATIENT)
Dept: RADIOLOGY | Facility: HOSPITAL | Age: 45
End: 2025-06-14
Payer: COMMERCIAL

## 2025-06-14 VITALS
WEIGHT: 200 LBS | HEIGHT: 65 IN | OXYGEN SATURATION: 100 % | SYSTOLIC BLOOD PRESSURE: 165 MMHG | BODY MASS INDEX: 33.32 KG/M2 | DIASTOLIC BLOOD PRESSURE: 90 MMHG | TEMPERATURE: 98.3 F | HEART RATE: 70 BPM | RESPIRATION RATE: 18 BRPM

## 2025-06-14 DIAGNOSIS — S93.601A RIGHT FOOT SPRAIN, INITIAL ENCOUNTER: ICD-10-CM

## 2025-06-14 DIAGNOSIS — I10 HYPERTENSION, UNSPECIFIED TYPE: ICD-10-CM

## 2025-06-14 DIAGNOSIS — S93.401A SPRAIN OF RIGHT ANKLE, UNSPECIFIED LIGAMENT, INITIAL ENCOUNTER: Primary | ICD-10-CM

## 2025-06-14 PROCEDURE — 73610 X-RAY EXAM OF ANKLE: CPT | Mod: RT

## 2025-06-14 PROCEDURE — 73610 X-RAY EXAM OF ANKLE: CPT | Mod: RIGHT SIDE | Performed by: RADIOLOGY

## 2025-06-14 PROCEDURE — 73630 X-RAY EXAM OF FOOT: CPT | Mod: RT

## 2025-06-14 PROCEDURE — 99284 EMERGENCY DEPT VISIT MOD MDM: CPT | Performed by: EMERGENCY MEDICINE

## 2025-06-14 PROCEDURE — 2500000001 HC RX 250 WO HCPCS SELF ADMINISTERED DRUGS (ALT 637 FOR MEDICARE OP): Performed by: EMERGENCY MEDICINE

## 2025-06-14 PROCEDURE — 73630 X-RAY EXAM OF FOOT: CPT | Mod: RIGHT SIDE | Performed by: RADIOLOGY

## 2025-06-14 RX ORDER — ACETAMINOPHEN 325 MG/1
975 TABLET ORAL ONCE
Status: COMPLETED | OUTPATIENT
Start: 2025-06-14 | End: 2025-06-14

## 2025-06-14 RX ORDER — IBUPROFEN 600 MG/1
600 TABLET, FILM COATED ORAL EVERY 6 HOURS PRN
Qty: 20 TABLET | Refills: 0 | Status: SHIPPED | OUTPATIENT
Start: 2025-06-14 | End: 2025-06-19

## 2025-06-14 RX ORDER — IBUPROFEN 400 MG/1
400 TABLET, FILM COATED ORAL ONCE
Status: COMPLETED | OUTPATIENT
Start: 2025-06-14 | End: 2025-06-14

## 2025-06-14 RX ADMIN — IBUPROFEN 400 MG: 400 TABLET, FILM COATED ORAL at 19:11

## 2025-06-14 RX ADMIN — ACETAMINOPHEN 975 MG: 325 TABLET ORAL at 19:11

## 2025-06-14 ASSESSMENT — PAIN DESCRIPTION - PAIN TYPE: TYPE: ACUTE PAIN

## 2025-06-14 ASSESSMENT — PAIN DESCRIPTION - PROGRESSION: CLINICAL_PROGRESSION: NOT CHANGED

## 2025-06-14 ASSESSMENT — PAIN DESCRIPTION - ONSET: ONSET: SUDDEN

## 2025-06-14 ASSESSMENT — PAIN DESCRIPTION - LOCATION: LOCATION: ANKLE

## 2025-06-14 ASSESSMENT — PAIN DESCRIPTION - ORIENTATION: ORIENTATION: RIGHT

## 2025-06-14 ASSESSMENT — PAIN DESCRIPTION - DESCRIPTORS: DESCRIPTORS: SHARP;STABBING

## 2025-06-14 ASSESSMENT — PAIN DESCRIPTION - FREQUENCY: FREQUENCY: INTERMITTENT

## 2025-06-14 ASSESSMENT — PAIN SCALES - GENERAL: PAINLEVEL_OUTOF10: 8

## 2025-06-14 ASSESSMENT — PAIN - FUNCTIONAL ASSESSMENT: PAIN_FUNCTIONAL_ASSESSMENT: 0-10

## 2025-06-14 NOTE — Clinical Note
Hannah Woods was seen and treated in our emergency department on 6/14/2025.  She may return to work on 06/15/2025.  Will need crutches for ambulation     If you have any questions or concerns, please don't hesitate to call.      Manuela Santamaria MD

## 2025-06-14 NOTE — DISCHARGE INSTRUCTIONS
Follow-up with your primary care physician within 1 to 2 days for further management of your current symptoms and repeat check of your blood pressure.    Follow-up with podiatry as scheduled    Return to the emergency department sooner with worsening of symptoms or onset of new symptoms

## 2025-06-14 NOTE — ED PROVIDER NOTES
HPI   Chief Complaint   Patient presents with    Ankle Injury     I was playing tennis in the street and hit a rock patch and twisted my akle the pain is sharp and stabbing with wt barring        Patient is a 45-year-old female presenting to the emergency department for evaluation of right ankle and foot pain.  Patient states that she was playing tennis in the road and she slipped on some gravel and twisted her ankle.  She states she is able to walk and move the ankle however it is just painful.  She states she did not hit her head and did not lose consciousness.  She is not complaining of pain anywhere else.  She denies any numbness or tingling in the leg.              Patient History   Medical History[1]  Surgical History[2]  Family History[3]  Social History[4]    Physical Exam   ED Triage Vitals [06/14/25 1842]   Temperature Heart Rate Respirations BP   36.8 °C (98.3 °F) 70 18 165/90      Pulse Ox Temp Source Heart Rate Source Patient Position   100 % Temporal Monitor Sitting      BP Location FiO2 (%)     Left arm --       Physical Exam  Vitals and nursing note reviewed.     GENERAL APPEARANCE: This patient is in no acute respiratory distress. Awake and alert. Talking appropriately. Answering questions appropriately. No evidence of pressured speech.    VITAL SIGNS: As per the nurses' triage record.    HEENT: Normocephalic, atraumatic.    NECK:  full gross ROM during exam    MUSCULOSKELETAL: Limited range of motion of the right ankle due to pain.  Tenderness over the proximal aspect of the right foot as well as lateral malleolus of the ankle with no visible deformities.  2+ strong and equal DP/PT pulses bilaterally.     NEUROLOGICAL: Awake, alert and oriented x 3.  Normal sensation in the radial, ulnar, and median nerve distribution of the right leg.    IMMUNOLOGICAL: No palpable lymphadenopathy or lymphatic streaking noted on visible skin.    DERM: No petechiae, rashes, or ecchymoses on visible skin    PSYCH:  mood and affect appear normal.        ED Course & MDM   Diagnoses as of 06/14/25 1927   Sprain of right ankle, unspecified ligament, initial encounter   Right foot sprain, initial encounter   Hypertension, unspecified type                 No data recorded     [place] Coma Scale Score: 15 (06/14/25 1839 : Moo Agustin, EMT)                           Medical Decision Making  **Disclaimer parts of this chart have been completed using voice recognition software. Please excuse any errors of transcription.     Patient seen in conjunction with attending physician     HPI: Detailed above.    Exam: A medically appropriate exam performed, outlined above, given the known history and presentation.    History obtained from: Patient    Labs/Diagnostics:  XR ankle right 3+ views   Final Result   No acute osseous abnormality identified.   Mild soft tissue swelling along the lateral aspect of the ankle.   Signed by Viraj Cameron      XR foot right 3+ views   Final Result   No acute osseous abnormality identified.   Mild soft tissue swelling along the lateral aspect of the ankle.   Signed by Viraj Cameron        EMERGENCY DEPARTMENT COURSE and DIFFERENTIAL DIAGNOSIS/MDM:  Patient is a 45-year-old female presenting to the emergency department for evaluation of right ankle and foot pain.  On physical exam vital signs remarkable for hypertension but otherwise stable and patient is in no acute distress.  She has some pain with range of motion of the right ankle with some tenderness over the proximal aspect of the foot and into the lateral malleolus.  No sign of any neurovascular compromise.  X-ray of the ankle and foot ordered. Consideration for fracture versus ankle sprain.  No evidence of compartment syndrome, neurovascular injury, or skin laceration.  No other injury, no tenderness over the fibular head. No evidence of secondary infection.  Ankle x-ray on my independent review shows no acute bony abnormality.  Advised supportive  "care including rest, ice, elevation.  Will discharge home with crutches, air splint, orthopedic follow-up. Patient was advised the possibility of missed fracture or ligamentous injury that may require further intervention particularly if symptoms not improving.    Vitals:    Vitals:    06/14/25 1842   BP: 165/90   BP Location: Left arm   Patient Position: Sitting   Pulse: 70   Resp: 18   Temp: 36.8 °C (98.3 °F)   TempSrc: Temporal   SpO2: 100%   Weight: 90.7 kg (200 lb)   Height: 1.651 m (5' 5\")     History Limited by:    None    Independent history obtained from:    None    External records reviewed:    None    Diagnostics interpreted by me:    Xrays - see my independent interpretation in MDM    Discussions with other clinicians:    None    Chronic conditions impacting care:    None    Social determinants of health affecting care:    None    Diagnostic tests considered but not performed: None    ED Medications managed:    Medications   acetaminophen (Tylenol) tablet 975 mg (975 mg oral Given 6/14/25 1911)   ibuprofen tablet 400 mg (400 mg oral Given 6/14/25 1911)       Prescription drugs considered:    None    Screenings:              Procedure  Splint Application    Performed by: Soumya Girard PA-C  Authorized by: Manuela Santamaria MD    Consent:     Consent obtained:  Verbal    Consent given by:  Patient    Risks, benefits, and alternatives were discussed: yes      Risks discussed:  Discoloration, numbness, swelling and pain    Alternatives discussed:  No treatment and delayed treatment  Universal protocol:     Procedure explained and questions answered to patient or proxy's satisfaction: yes      Imaging studies available: yes      Site/side marked: yes      Immediately prior to procedure a time out was called: yes      Patient identity confirmed:  Verbally with patient  Pre-procedure details:     Distal neurologic exam:  Normal    Distal perfusion: distal pulses strong and brisk capillary refill    Procedure " details:     Location:  Ankle    Ankle location:  R ankle    Supplies:  Prefabricated splint    Supervision: I personally supervised and inspected the splint/strap which was applied. The extremity is appropriately immobilized. Patient neurovascularly intact before and after the splint application.    Post-procedure details:     Distal neurologic exam:  Unchanged    Distal perfusion: unchanged      Procedure completion:  Tolerated well, no immediate complications    Post-procedure imaging: not applicable           Soumya Girard PA-C  06/14/25 1927         [1] No past medical history on file.  [2]   Past Surgical History:  Procedure Laterality Date    HYSTERECTOMY     [3]   Family History  Problem Relation Name Age of Onset    No Known Problems Mother      No Known Problems Father      No Known Problems Sister      No Known Problems Brother      No Known Problems Daughter      No Known Problems Son      No Known Problems Son     [4]   Social History  Tobacco Use    Smoking status: Never    Smokeless tobacco: Never   Substance Use Topics    Alcohol use: Yes     Comment: rarely    Drug use: Not on file        Soumya Girard PA-C  06/14/25 1927

## 2025-06-14 NOTE — PROGRESS NOTES
Attestation/Supervisory note for HEIKE Girard      The patient is a 45-year-old female presenting to the emergency department for evaluation of right ankle and foot pain.  The patient states that she was playing tennis with her children in the road about 2 hours prior to arrival.  She states that she slipped on some gravel and twisted her ankle and fell.  She states she is able to walk and stand but is just painful to do so.  She states it is painful whenever she moves her right ankle.  She did not hit her head or lose consciousness.  No other injury or trauma.  No focal weakness or numbness.  No headache or visual changes.  No chest pain.  No abdominal pain.  No nausea or vomiting.  No diarrhea or constipation or no urinary complaints.  She does not use any blood thinners.  No neck or back pain.  All pertinent positives and negatives are recorded above.  All other systems reviewed and otherwise negative.  Vital signs with hypertension but otherwise within normal limits.  Physical exam with a well-nourished well-developed female in no acute distress.  HEENT exam within normal limits.  She has no evidence of airway compromise or respiratory distress.  Abdominal exam is benign.  She does not have any gross motor, neurologic or vascular deficits on exam.  Pulses are equal bilaterally.  No visible or palpable bony deformities on exam.  She does have pain with palpation of the right proximal foot and with palpation range of motion of the right ankle.      Oral ibuprofen and oral acetaminophen ordered      XR ankle right 3+ views   Final Result   No acute osseous abnormality identified.   Mild soft tissue swelling along the lateral aspect of the ankle.   Signed by Viraj Cameron      XR foot right 3+ views   Final Result   No acute osseous abnormality identified.   Mild soft tissue swelling along the lateral aspect of the ankle.   Signed by Viraj Cameron           The patient does not have any gross motor, neurologic or  vascular deficits on exam.  No visible or palpable bony deformities on exam.  No evidence of fracture or dislocation on x-rays of the right ankle and right foot.  She is neurovascularly intact and has equal pulses bilaterally.  No joint laxity.  The patient did have improvement in her symptoms with medications given in emergency room.  An Aircast was applied by nursing staff.  The patient was neurovascularly intact after Aircast application.  The patient was also given crutches to assist with ambulation.      The patient was released in good condition.  She will follow-up with her primary care physician within 1 to 2 days for further management of her current symptoms and repeat check of her blood pressure.  She was also given a referral to podiatry.  She will return to the emergency department sooner with worsening of symptoms or onset of new symptoms.  Rx given for ibuprofen.      Impression/diagnosis:  Right ankle/foot sprain  Hypertension, unspecified      I personally saw the patient and made/approve the management plan and take responsibility for the patient management.      I personally discussed the patient's management with the patient      I reviewed the results of the diagnostic imaging.  Formal radiology read was completed by the radiologist.      Manuela Santamaria MD